# Patient Record
Sex: FEMALE | Race: WHITE
[De-identification: names, ages, dates, MRNs, and addresses within clinical notes are randomized per-mention and may not be internally consistent; named-entity substitution may affect disease eponyms.]

---

## 2023-03-07 ENCOUNTER — HOSPITAL ENCOUNTER (OUTPATIENT)
Dept: HOSPITAL 33 - SDC | Age: 38
Discharge: HOME | End: 2023-03-07
Attending: OBSTETRICS & GYNECOLOGY
Payer: COMMERCIAL

## 2023-03-07 VITALS — DIASTOLIC BLOOD PRESSURE: 75 MMHG | HEART RATE: 70 BPM | SYSTOLIC BLOOD PRESSURE: 126 MMHG | OXYGEN SATURATION: 97 %

## 2023-03-07 DIAGNOSIS — D06.9: Primary | ICD-10-CM

## 2023-03-07 PROCEDURE — 81025 URINE PREGNANCY TEST: CPT

## 2023-03-08 NOTE — OP
SURGERY DATE/TIME:  03/07/2023  1144



PREOPERATIVE DIAGNOSIS:     Severe cervical dysplasia. 



POSTOPERATIVE DIAGNOSIS:    Severe cervical dysplasia. 



PROCEDURE:      Loop electrosurgical excision procedure (LEEP).  



SURGEON:        Ben José D.O.



ASSISTANT:      Mary Brady surgical tech. 



ANESTHESIA:    General. 



ESTIMATED BLOOD LOSS:  Minimal. 



COMPLICATIONS:  None. 



INDICATIONS:  The risks, benefits, indications and alternatives of the procedure were 
reviewed with the patient prior to procedure. The patient understood the risk of 
infection, bleeding, cervical incompetence, infection and thromboembolic disorder 
associated with this procedure and desires to have this procedure as a possible means to 
alleviate her current medical condition. 



DESCRIPTION OF PROCEDURE AND FINDINGS:  At this point the patient is taken to the 
operating room, given general sedation, placed in the dorsal lithotomy position, prepped 
and draped in the usual sterile fashion. A coated speculum is then placed into the 
patient's vagina and the cervix was then injected with approximately 8 cc of 1% lidocaine 
with epinephrine. From this point the loop instrument was used to excise the ectocervical 
portion with an in depth of 7 to 8 mm of ectocervical tissue which was excised in right to 
left motion and was done so without complication. An additional incision was made and 2 to 
3 mm of endocervical tissue was excised in similar fashion. Again, hemostasis was noted by 
placing the loop coagulator ball on the surface of the cervix. From this point hemostasis 
was obtained. Monsel solution was placed on the cervix for further hemostasis. From this 
point all instruments were removed from the patient's vaginal region. The patient was then 
taken out of the dorsal lithotomy position, was taken out of anesthesia and was then taken 
to the recovery room in stable condition. All instruments and laps were accounted for x2.